# Patient Record
Sex: MALE | Race: BLACK OR AFRICAN AMERICAN | NOT HISPANIC OR LATINO | Employment: OTHER | URBAN - METROPOLITAN AREA
[De-identification: names, ages, dates, MRNs, and addresses within clinical notes are randomized per-mention and may not be internally consistent; named-entity substitution may affect disease eponyms.]

---

## 2020-07-31 PROBLEM — R07.9 CHEST PAIN: Status: ACTIVE | Noted: 2020-07-31

## 2020-08-01 PROBLEM — N18.30 CHRONIC KIDNEY DISEASE, STAGE III (MODERATE): Status: ACTIVE | Noted: 2017-03-06

## 2020-08-01 PROBLEM — I16.0 HYPERTENSIVE URGENCY: Status: ACTIVE | Noted: 2020-08-01

## 2020-08-01 PROBLEM — F14.10 COCAINE ABUSE: Status: ACTIVE | Noted: 2020-08-01

## 2020-08-03 PROBLEM — I16.0 HYPERTENSIVE URGENCY: Status: RESOLVED | Noted: 2020-08-01 | Resolved: 2020-08-03

## 2020-09-12 PROBLEM — R07.9 CHEST PAIN: Status: RESOLVED | Noted: 2020-07-31 | Resolved: 2020-09-12

## 2020-10-10 PROBLEM — M89.8X9 LYTIC LESION OF BONE ON X-RAY: Status: ACTIVE | Noted: 2020-10-10

## 2020-10-10 PROBLEM — S24.102A: Status: ACTIVE | Noted: 2020-10-10

## 2020-10-10 PROBLEM — F12.20 MARIJUANA DEPENDENCE: Status: ACTIVE | Noted: 2020-10-10

## 2020-10-10 PROBLEM — M89.9 LYTIC LESION OF BONE ON X-RAY: Status: ACTIVE | Noted: 2020-10-10

## 2020-10-10 PROBLEM — M89.9 LESION OF VERTEBRA: Status: ACTIVE | Noted: 2020-10-10

## 2020-10-10 PROBLEM — G95.20 SPINAL CORD COMPRESSION: Status: ACTIVE | Noted: 2020-10-10

## 2020-10-10 PROBLEM — G95.29 SPINAL CORD COMPRESSION DUE TO MALIGNANT NEOPLASM METASTATIC TO SPINE: Status: ACTIVE | Noted: 2020-10-10

## 2020-10-10 PROBLEM — C79.51 SPINAL CORD COMPRESSION DUE TO MALIGNANT NEOPLASM METASTATIC TO SPINE: Status: ACTIVE | Noted: 2020-10-10

## 2020-10-10 PROBLEM — R64 CACHEXIA: Status: ACTIVE | Noted: 2020-10-10

## 2020-10-10 PROBLEM — F14.20 COCAINE DEPENDENCE: Status: ACTIVE | Noted: 2020-08-01

## 2020-10-12 PROBLEM — A53.9 SYPHILIS: Status: ACTIVE | Noted: 2020-10-12

## 2020-10-13 PROBLEM — R78.81 BACTEREMIA: Status: ACTIVE | Noted: 2020-10-13

## 2020-10-13 PROBLEM — R82.71 ASYMPTOMATIC BACTERIURIA: Status: ACTIVE | Noted: 2020-10-13

## 2020-10-14 PROBLEM — R22.41 MASS OF RIGHT THIGH: Status: ACTIVE | Noted: 2020-10-14

## 2020-10-14 PROBLEM — K82.8 GALLBLADDER MASS: Status: ACTIVE | Noted: 2020-10-14

## 2020-10-17 PROBLEM — R53.1 LEFT-SIDED WEAKNESS: Status: ACTIVE | Noted: 2020-10-17

## 2020-10-17 PROBLEM — E87.5 HYPERKALEMIA: Status: ACTIVE | Noted: 2020-10-17

## 2020-10-17 PROBLEM — S06.5XAA SUBDURAL HEMATOMA: Status: ACTIVE | Noted: 2020-10-17

## 2020-10-20 PROBLEM — R90.89 LEPTOMENINGEAL ENHANCEMENT ON MRI OF BRAIN: Status: ACTIVE | Noted: 2020-10-20

## 2020-10-22 PROBLEM — C64.9 METASTATIC RENAL CELL CARCINOMA: Status: ACTIVE | Noted: 2020-10-22

## 2020-10-23 PROBLEM — R60.9 SWELLING: Status: ACTIVE | Noted: 2020-10-23

## 2020-11-16 PROBLEM — N43.3 HYDROCELE IN ADULT: Status: ACTIVE | Noted: 2020-11-16

## 2020-11-16 PROBLEM — R35.0 URINARY FREQUENCY: Status: ACTIVE | Noted: 2020-11-16

## 2020-11-18 PROBLEM — Z98.1 STATUS POST THORACIC SPINAL FUSION: Status: ACTIVE | Noted: 2020-11-18

## 2020-12-01 PROBLEM — E87.5 HYPERKALEMIA: Status: RESOLVED | Noted: 2020-10-17 | Resolved: 2020-12-01

## 2020-12-01 PROBLEM — R82.71 ASYMPTOMATIC BACTERIURIA: Status: RESOLVED | Noted: 2020-10-13 | Resolved: 2020-12-01

## 2021-03-18 PROBLEM — R07.9 CHEST PAIN: Status: ACTIVE | Noted: 2021-03-18

## 2021-03-18 PROBLEM — F14.11 HISTORY OF COCAINE ABUSE: Status: ACTIVE | Noted: 2021-03-18

## 2021-03-18 PROBLEM — N40.0 BPH (BENIGN PROSTATIC HYPERPLASIA): Status: ACTIVE | Noted: 2021-03-18

## 2021-03-18 PROBLEM — F10.11 HISTORY OF ALCOHOL ABUSE: Status: ACTIVE | Noted: 2021-03-18

## 2021-03-18 PROBLEM — Z86.79 HX OF SUBDURAL HEMATOMA: Status: ACTIVE | Noted: 2021-03-18

## 2021-05-11 PROBLEM — G95.20 CORD COMPRESSION: Status: ACTIVE | Noted: 2021-05-11

## 2021-05-13 PROBLEM — Z71.89 ACP (ADVANCE CARE PLANNING): Status: ACTIVE | Noted: 2021-05-13

## 2021-05-13 PROBLEM — Z01.818 PREOPERATIVE EVALUATION TO RULE OUT SURGICAL CONTRAINDICATION: Status: ACTIVE | Noted: 2021-05-13

## 2021-05-19 PROBLEM — R60.0 LOWER EXTREMITY EDEMA: Status: ACTIVE | Noted: 2021-05-19

## 2021-05-20 PROBLEM — Z71.89 ACP (ADVANCE CARE PLANNING): Status: RESOLVED | Noted: 2021-05-13 | Resolved: 2021-05-20

## 2021-05-20 PROBLEM — G95.20 CORD COMPRESSION: Status: RESOLVED | Noted: 2021-05-11 | Resolved: 2021-05-20

## 2021-09-09 DIAGNOSIS — U07.1 COVID-19 VIRUS DETECTED: ICD-10-CM

## 2021-09-09 PROBLEM — R06.02 SHORTNESS OF BREATH: Status: ACTIVE | Noted: 2021-09-09

## 2021-09-09 PROBLEM — J96.01 ACUTE HYPOXEMIC RESPIRATORY FAILURE: Status: ACTIVE | Noted: 2021-09-09

## 2021-09-10 PROBLEM — R19.7 DIARRHEA: Status: ACTIVE | Noted: 2021-09-10

## 2021-09-12 PROBLEM — R35.0 URINARY FREQUENCY: Status: RESOLVED | Noted: 2020-11-16 | Resolved: 2021-09-12

## 2021-09-12 PROBLEM — R60.9 SWELLING: Status: RESOLVED | Noted: 2020-10-23 | Resolved: 2021-09-12

## 2021-12-10 PROBLEM — R06.02 SHORTNESS OF BREATH: Status: RESOLVED | Noted: 2021-09-09 | Resolved: 2021-12-10

## 2021-12-10 PROBLEM — J96.01 ACUTE HYPOXEMIC RESPIRATORY FAILURE: Status: RESOLVED | Noted: 2021-09-09 | Resolved: 2021-12-10

## 2021-12-10 PROBLEM — R07.9 CHEST PAIN: Status: RESOLVED | Noted: 2021-03-18 | Resolved: 2021-12-10

## 2021-12-10 PROBLEM — R19.7 DIARRHEA: Status: RESOLVED | Noted: 2021-09-10 | Resolved: 2021-12-10

## 2021-12-10 PROBLEM — U07.1 COVID-19: Status: RESOLVED | Noted: 2021-09-09 | Resolved: 2021-12-10

## 2021-12-10 PROBLEM — R60.0 LOWER EXTREMITY EDEMA: Status: RESOLVED | Noted: 2021-05-19 | Resolved: 2021-12-10

## 2022-06-07 PROBLEM — R19.8 ANAL DISCHARGE: Chronic | Status: ACTIVE | Noted: 2022-06-07

## 2022-07-06 PROBLEM — R19.8 ANAL DISCHARGE: Chronic | Status: RESOLVED | Noted: 2022-06-07 | Resolved: 2022-07-06

## 2022-09-28 PROBLEM — E03.2 HYPOTHYROIDISM DUE TO MEDICATION: Status: ACTIVE | Noted: 2022-09-28

## 2022-09-28 PROBLEM — E83.39 HYPOPHOSPHATEMIA: Status: ACTIVE | Noted: 2022-09-28

## 2022-09-28 PROBLEM — E83.51 HYPOCALCEMIA: Status: ACTIVE | Noted: 2022-09-28

## 2022-09-28 PROBLEM — L84 FOOT CALLUS: Status: ACTIVE | Noted: 2022-09-28

## 2022-09-28 PROBLEM — F17.200 NICOTINE DEPENDENCE WITH CURRENT USE: Status: ACTIVE | Noted: 2022-09-28

## 2022-09-28 PROBLEM — E83.42 HYPOMAGNESEMIA: Status: ACTIVE | Noted: 2022-09-28

## 2022-09-30 PROBLEM — R74.8 ELEVATED LIVER ENZYMES: Status: ACTIVE | Noted: 2022-09-30

## 2022-10-01 PROBLEM — F17.200 NICOTINE DEPENDENCE WITH CURRENT USE: Status: RESOLVED | Noted: 2022-09-28 | Resolved: 2022-10-01

## 2022-10-01 PROBLEM — E83.42 HYPOMAGNESEMIA: Status: RESOLVED | Noted: 2022-09-28 | Resolved: 2022-10-01

## 2022-12-22 PROBLEM — L97.422 CHRONIC HEEL ULCER, LEFT, WITH FAT LAYER EXPOSED: Status: ACTIVE | Noted: 2022-12-22

## 2023-02-16 PROBLEM — S91.302A: Status: ACTIVE | Noted: 2023-02-16

## 2023-04-12 PROBLEM — R78.81 BACTEREMIA: Status: RESOLVED | Noted: 2020-10-13 | Resolved: 2023-04-12

## 2023-04-12 PROBLEM — M79.2 NEUROPATHIC PAIN: Status: ACTIVE | Noted: 2023-04-12

## 2023-11-01 PROBLEM — K80.50 CHOLEDOCHOLITHIASIS: Status: ACTIVE | Noted: 2023-11-01

## 2023-12-21 ENCOUNTER — SOCIAL WORK (OUTPATIENT)
Dept: ADMINISTRATIVE | Facility: OTHER | Age: 68
End: 2023-12-21
Payer: MEDICAID

## 2023-12-21 NOTE — PROGRESS NOTES
Contacted [phone] and pt spouse(Shayy Maldonado) answered and she was informed that [facility] has been calling pt to setup a shipment of his [employer] but have been unable to reach them. Pt wife reports that she receives so many spam calls and don't answer. Pt spouse was advise to call [facility] to schedule a refill for pt and she states she was going to call them right now. No other needs were noted at that time. Will continue to follow and assist as needed.       Taina Dawn LMSW    Ext [phone]/Pager [phone]

## 2024-02-16 PROBLEM — K80.20 CHOLELITHIASIS: Status: ACTIVE | Noted: 2024-02-16

## 2024-04-17 PROBLEM — C79.51 METASTASIS TO BONE: Status: ACTIVE | Noted: 2024-04-17

## 2024-08-08 PROBLEM — H60.90 OTITIS EXTERNA: Status: ACTIVE | Noted: 2024-08-08

## 2024-08-08 PROBLEM — E03.9 HYPOTHYROIDISM: Status: ACTIVE | Noted: 2024-08-08

## 2025-01-06 PROBLEM — H92.13 OTORRHEA OF BOTH EARS: Status: ACTIVE | Noted: 2025-01-06

## 2025-01-10 PROBLEM — R97.20 ELEVATED PSA: Status: ACTIVE | Noted: 2025-01-10

## 2025-05-16 PROBLEM — C61 PROSTATE CANCER: Status: ACTIVE | Noted: 2025-05-16

## 2025-06-22 PROBLEM — R00.1 BRADYCARDIA: Status: RESOLVED | Noted: 2025-06-22 | Resolved: 2025-06-22

## 2025-06-22 PROBLEM — I95.9 HYPOTENSION: Status: ACTIVE | Noted: 2025-06-22

## 2025-06-22 PROBLEM — H92.01 RIGHT EAR PAIN: Status: ACTIVE | Noted: 2025-06-22

## 2025-06-22 PROBLEM — R00.1 BRADYCARDIA: Status: ACTIVE | Noted: 2025-06-22

## 2025-06-22 PROBLEM — R07.9 CHEST PAIN: Status: RESOLVED | Noted: 2020-07-31 | Resolved: 2025-06-22

## 2025-06-22 PROBLEM — N39.0 URINARY TRACT INFECTION WITHOUT HEMATURIA: Status: ACTIVE | Noted: 2025-06-22

## 2025-06-22 PROBLEM — Z13.6 SCREENING FOR CARDIOVASCULAR CONDITION: Status: RESOLVED | Noted: 2021-05-13 | Resolved: 2025-06-22

## 2025-06-22 PROBLEM — Z13.6 SCREENING FOR CARDIOVASCULAR CONDITION: Status: ACTIVE | Noted: 2021-05-13

## 2025-06-22 PROBLEM — A41.9 SEPSIS: Status: ACTIVE | Noted: 2025-06-22

## 2025-06-23 PROBLEM — E43 SEVERE MALNUTRITION: Status: ACTIVE | Noted: 2025-06-23

## 2025-07-02 PROBLEM — G89.29 CHRONIC PAIN: Status: ACTIVE | Noted: 2025-07-02

## 2025-07-24 PROBLEM — L60.0 INGROWN TOENAIL: Status: ACTIVE | Noted: 2025-07-24

## 2025-08-21 ENCOUNTER — OUTPATIENT CASE MANAGEMENT (OUTPATIENT)
Dept: ADMINISTRATIVE | Facility: OTHER | Age: 70
End: 2025-08-21
Payer: MEDICAID

## 2025-08-28 ENCOUNTER — OUTPATIENT CASE MANAGEMENT (OUTPATIENT)
Dept: ADMINISTRATIVE | Facility: OTHER | Age: 70
End: 2025-08-28
Payer: MEDICAID